# Patient Record
Sex: FEMALE | Race: WHITE | Employment: FULL TIME | URBAN - METROPOLITAN AREA
[De-identification: names, ages, dates, MRNs, and addresses within clinical notes are randomized per-mention and may not be internally consistent; named-entity substitution may affect disease eponyms.]

---

## 2020-04-20 ENCOUNTER — HOSPITAL ENCOUNTER (EMERGENCY)
Facility: CLINIC | Age: 35
Discharge: HOME | End: 2020-04-20
Attending: PHYSICIAN ASSISTANT | Admitting: PHYSICIAN ASSISTANT
Payer: COMMERCIAL

## 2020-04-20 ENCOUNTER — APPOINTMENT (OUTPATIENT)
Dept: GENERAL RADIOLOGY | Facility: CLINIC | Age: 35
End: 2020-04-20
Attending: PHYSICIAN ASSISTANT
Payer: COMMERCIAL

## 2020-04-20 VITALS
TEMPERATURE: 99.4 F | OXYGEN SATURATION: 96 % | BODY MASS INDEX: 27.66 KG/M2 | WEIGHT: 162 LBS | HEART RATE: 75 BPM | SYSTOLIC BLOOD PRESSURE: 133 MMHG | HEIGHT: 64 IN | DIASTOLIC BLOOD PRESSURE: 72 MMHG | RESPIRATION RATE: 18 BRPM

## 2020-04-20 DIAGNOSIS — S69.92XA HAND INJURY, LEFT, INITIAL ENCOUNTER: ICD-10-CM

## 2020-04-20 PROCEDURE — 25000128 H RX IP 250 OP 636: Performed by: PHYSICIAN ASSISTANT

## 2020-04-20 PROCEDURE — 73130 X-RAY EXAM OF HAND: CPT | Mod: LT

## 2020-04-20 PROCEDURE — 90715 TDAP VACCINE 7 YRS/> IM: CPT | Performed by: PHYSICIAN ASSISTANT

## 2020-04-20 PROCEDURE — 99284 EMERGENCY DEPT VISIT MOD MDM: CPT | Mod: 25

## 2020-04-20 PROCEDURE — 90471 IMMUNIZATION ADMIN: CPT

## 2020-04-20 RX ADMIN — CLOSTRIDIUM TETANI TOXOID ANTIGEN (FORMALDEHYDE INACTIVATED), CORYNEBACTERIUM DIPHTHERIAE TOXOID ANTIGEN (FORMALDEHYDE INACTIVATED), BORDETELLA PERTUSSIS TOXOID ANTIGEN (GLUTARALDEHYDE INACTIVATED), BORDETELLA PERTUSSIS FILAMENTOUS HEMAGGLUTININ ANTIGEN (FORMALDEHYDE INACTIVATED), BORDETELLA PERTUSSIS PERTACTIN ANTIGEN, AND BORDETELLA PERTUSSIS FIMBRIAE 2/3 ANTIGEN 0.5 ML: 5; 2; 2.5; 5; 3; 5 INJECTION, SUSPENSION INTRAMUSCULAR at 22:17

## 2020-04-20 ASSESSMENT — ENCOUNTER SYMPTOMS: WOUND: 1

## 2020-04-20 ASSESSMENT — MIFFLIN-ST. JEOR: SCORE: 1429.83

## 2020-04-20 NOTE — ED AVS SNAPSHOT
[facility] Emergency Department  [address]  [facility] [address]  Phone:  [phone]  Fax:  [phone]                                    Estrella Hardin   MRN: [id number]    Department:  [facility] Emergency Department   Date of Visit:  4/20/2020           After Visit Summary Signature Page    I have received my discharge instructions, and my questions have been answered. I have discussed any challenges I see with this plan with the nurse or doctor.    ..........................................................................................................................................  Patient/Patient Representative Signature      ..........................................................................................................................................  Patient Representative Print Name and Relationship to Patient    ..................................................               ................................................  Date                                   Time    ..........................................................................................................................................  Reviewed by Signature/Title    ...................................................              ..............................................  Date                                               Time          22EPIC Rev 08/18

## 2020-04-21 NOTE — DISCHARGE INSTRUCTIONS
Continue Tylenol, ibuprofen, ice, resting the hand.  He can wear the brace to help give it support.  Return for any changing worsening symptoms, new concerns.

## 2020-04-21 NOTE — ED PROVIDER NOTES
"  History     Chief Complaint:    Hand Injury    The history is provided by the patient.      Estrella Hardin is a 34 year old female who presents with left hand injury. The patient states that her left hand got caught in between her dog's leash and a railing. It did not occur immediately prior to ED arrival. She has an abrasion to the dorsal aspect of her left hand. She denies difficulty moving her hand or fingers. The patient has taken ibuprofen for her pain. Her most recent Tdap was in 1994.     Allergies:  Sulfa drugs      Medications:    The patient is currently on no regular medications.    Past Medical History:    Anxiety  Depression  Obesity  Alcohol use disorder  Tobacco use disorder  GERD  Carpal tunnel syndrome  Drug dependence    Past Surgical History:    [place] teeth extraction    Family History:    No past pertinent family history.    Social History:  Positive for tobacco use.  Positive for alcohol use.   Drug use - unknown.  Marital Status:  Unknown.     Review of Systems   Skin: Positive for wound (left hand abrasion).   All other systems reviewed and are negative.    Physical Exam     Patient Vitals for the past 24 hrs:   BP Temp Temp src Pulse Resp SpO2 Height Weight   04/20/20 2148 133/72 99.4  F (37.4  C) Oral 75 18 96 % 1.626 m (5' 4\") 73.5 kg (162 lb)     Physical Exam  General: Well appearing, well nourished. Normal mood and affect.  Skin: Superficial abrasion < 1 cm to the dorsal aspect of the left hand. No active bleeding. Small hematoma forming here. Superficial in nature.   HEENT: Head: Normocephalic, atraumatic, no visible masses.   Eyes: Conjunctiva clear.  Cardiac: Normal rate and regular rhythm, no murmur or gallop.   Lungs: Clear to auscultation.  Abdomen: Abdomen soft, non-tender. No rebound tenderness of guarding.   Musculoskeletal:   Left Hand: Mild tenderness to palpation throughout the dorsal aspect of the left hand. No tenderness palpation along the distal radius or ulna.  No " snuffbox tenderness.  No tenderness palpation throughout the remaining wrist or finger bones.  Full flexion extension of wrist without pain or difficulty. The finger flexors (FDS/FDP) and extensors are intact. Able to make okay sign, thumbs up, peace sign and cross fingers.  The thumb exam is normal, including: Adduction, abduction, flexion, extension, opposition. There are no sensory deficits, Median, Ulnar, and Radial nerve function is normal. Radial artery pulsations are normal. Capillary refill is normal.   Neurologic: Oriented x 3. GCS: 15.  Psychiatric: Intact recent and remote memory, judgment and insight, normal mood and affect.     Emergency Department Course     Imaging:  Radiology findings were communicated with the patient who voiced understanding of the findings.    XR  Hand Left G/E 3 Views:   Normal joint spaces and alignment. No fracture, as per radiology.     Interventions:  2217: Tdap 0.5 mL IM    Emergency Department Course:  Past medical records, nursing notes, and vitals reviewed.    2152: I performed an exam of the patient as documented above.     The patient was sent for a left hand x-ray while in the emergency department, results above.     2225: I rechecked the patient and discussed the results of her workup thus far.     I personally reviewed the imaging results with the Patient and answered all related questions prior to discharge.     Findings and plan explained to the Patient. Patient discharged home with instructions regarding supportive care, medications, and reasons to return. The importance of close follow-up was reviewed.     Impression & Plan      Medical Decision Making:  Estrella Hardin is a 34 year old female who presented the ED today for evaluation of left hand pain.  Details of patient's history can be noted in the HPI.  Differential diagnosis included sprain, strain, fracture, tendon rupture, nerve impingement, compartment syndrome, soft tissue injury, amongst others.   X-ray was negative here today.  I suspect bony contusion and soft tissue injury.  There was a superficial abrasion that was not amenable to a laceration repair.  I advised rest, ice, elevation, Tylenol, ibuprofen at home to help with pain.  Their exam was negative for other injury.  She was placed in a wrist brace to help with comfort.  She will follow-up with primary if not improving. All questions were answered prior to the patient's discharge.  They were in agreement with the treatment plan as stated above.    Diagnosis:    ICD-10-CM    1. Hand injury, left, initial encounter  S69.92XA      Disposition:  Discharged to home.    Discharge Medications:  New Prescriptions    No medications on file     Scribe Disclosure:  I, Rody Grant, am serving as a scribe at 9:48 PM on 4/20/2020 to document services personally performed by Jeanna Evangelista PA based on my observations and the provider's statements to me.     Rody Grant  4/20/2020   [facility] EMERGENCY DEPARTMENT    This was created at least in part with a voice recognition software. Mistakes/typos may be present.        Jeanna Evangelista PA  04/20/20 [id number]

## 2020-04-21 NOTE — ED TRIAGE NOTES
Getting ready to take the dog out when the leash got wrapped around patient's hand.  Dog was excited to get outside and pulled leash, hand rubbed against railing. Able to move fingers, hand.  Radial pulses present.  CMS intact.  Took ibuprofen prior to arrival. ABCD's intact; A/o x 4.